# Patient Record
Sex: MALE | Race: BLACK OR AFRICAN AMERICAN
[De-identification: names, ages, dates, MRNs, and addresses within clinical notes are randomized per-mention and may not be internally consistent; named-entity substitution may affect disease eponyms.]

---

## 2019-06-21 ENCOUNTER — HOSPITAL ENCOUNTER (EMERGENCY)
Dept: HOSPITAL 74 - JER | Age: 39
Discharge: HOME | End: 2019-06-21
Payer: COMMERCIAL

## 2019-06-21 VITALS — DIASTOLIC BLOOD PRESSURE: 64 MMHG | TEMPERATURE: 98.3 F | HEART RATE: 71 BPM | SYSTOLIC BLOOD PRESSURE: 113 MMHG

## 2019-06-21 VITALS — BODY MASS INDEX: 25.8 KG/M2
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
## 2019-06-21 DIAGNOSIS — M79.645: Primary | ICD-10-CM
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
## 2022-09-03 ENCOUNTER — HOSPITAL ENCOUNTER (EMERGENCY)
Dept: HOSPITAL 74 - JERFT | Age: 42
Discharge: HOME | End: 2022-09-03
Payer: COMMERCIAL

## 2022-09-03 VITALS
TEMPERATURE: 98 F | SYSTOLIC BLOOD PRESSURE: 101 MMHG | RESPIRATION RATE: 18 BRPM | HEART RATE: 92 BPM | DIASTOLIC BLOOD PRESSURE: 61 MMHG

## 2022-09-03 VITALS — BODY MASS INDEX: 25.7 KG/M2

## 2022-09-03 DIAGNOSIS — M79.644: Primary | ICD-10-CM
